# Patient Record
Sex: FEMALE | Race: WHITE | URBAN - NONMETROPOLITAN AREA
[De-identification: names, ages, dates, MRNs, and addresses within clinical notes are randomized per-mention and may not be internally consistent; named-entity substitution may affect disease eponyms.]

---

## 2022-09-20 ENCOUNTER — OFFICE VISIT (OUTPATIENT)
Dept: PSYCHIATRY | Age: 22
End: 2022-09-20
Payer: COMMERCIAL

## 2022-09-20 DIAGNOSIS — F90.2 ATTENTION DEFICIT HYPERACTIVITY DISORDER (ADHD), COMBINED TYPE: ICD-10-CM

## 2022-09-20 DIAGNOSIS — F39 UNSPECIFIED MOOD (AFFECTIVE) DISORDER (HCC): Primary | ICD-10-CM

## 2022-09-20 DIAGNOSIS — F41.9 ANXIETY: ICD-10-CM

## 2022-09-20 PROCEDURE — 90792 PSYCH DIAG EVAL W/MED SRVCS: CPT | Performed by: STUDENT IN AN ORGANIZED HEALTH CARE EDUCATION/TRAINING PROGRAM

## 2022-09-20 RX ORDER — CITALOPRAM 40 MG/1
40 TABLET ORAL DAILY
Qty: 30 TABLET | Refills: 2 | Status: SHIPPED | OUTPATIENT
Start: 2022-09-20
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
## 2022-10-18 ENCOUNTER — OFFICE VISIT (OUTPATIENT)
Dept: PSYCHIATRY | Age: 22
End: 2022-10-18
Payer: COMMERCIAL

## 2022-10-18 DIAGNOSIS — F39 UNSPECIFIED MOOD (AFFECTIVE) DISORDER (HCC): ICD-10-CM

## 2022-10-18 DIAGNOSIS — F41.9 ANXIETY: ICD-10-CM

## 2022-10-18 DIAGNOSIS — F90.2 ATTENTION DEFICIT HYPERACTIVITY DISORDER (ADHD), COMBINED TYPE: ICD-10-CM

## 2022-10-18 DIAGNOSIS — G47.00 INSOMNIA, UNSPECIFIED TYPE: Primary | ICD-10-CM

## 2022-10-18 PROCEDURE — 99214 OFFICE O/P EST MOD 30 MIN: CPT | Performed by: STUDENT IN AN ORGANIZED HEALTH CARE EDUCATION/TRAINING PROGRAM

## 2022-10-18 RX ORDER — PRAZOSIN HYDROCHLORIDE 2 MG/1
2 CAPSULE ORAL NIGHTLY
Qty: 30 CAPSULE | Refills: 3 | Status: SHIPPED | OUTPATIENT
Start: 2022-10-18

## 2022-10-18 NOTE — PROGRESS NOTES
[address]  [facility] [place] [address]  Dept: [phone]  Loc: [address] [place] [address]  2000  10/18/2022     F/U appt    DSM:  Dx:    ICD-10-CM    1. Insomnia, unspecified type  G47.00 prazosin (MINIPRESS) 2 MG capsule      2. Unspecified mood (affective) disorder (HCC)  F39       3. Anxiety  F41.9       4. Attention deficit hyperactivity disorder (ADHD), combined type  F90.2            Interim Hx:  Chief Complaint:   Chief Complaint   Patient presents with    Follow-up    Medication Check      HPI: Patient is a 25year old female who presents for follow up care. Depression and anxiety have both stabilized since increasing citalopram to 40 mg daily. She reports compliance and denies side effects. Her biggest concern is increased irritability which she attributes to poor sleep. She reports frequent awakenings due to night terrors. She reports improved energy and appetite. Denies active and passive SI, HI, AH and VH. ROS:   Gen: Denies F/C/N/V  HEENT: Denies Vision/hearing changes/sore throat  CV: Denies CP/Palp  Pulm: Denies SOB/Cough/Wheeze  GI: Denies Abd pain  Skin: Denies Rashes/Lumps/Bruises  Neurologic: Denies changes in memory/speech/mental status.  Denies h/o seizures/DTs   Psychiatric: denies depression and anxiety     MSE:  Cognition - Alert and Oriented x 4  Appearance - dressed casually and appropriately for the weather  Behavior - pleasant and cooperative  Motor - no tardive dyskinesia or other EPS observed; no psychomotor agitation/retardation observed  Gait and Station - WNL, ambulates without Assistance  Speech - normal rate, rhythm, and volume  Eye Contact - maintains  Mood - \"okay right now\"  Affect - congruent; full range  Thought Content/Perceptions:   Psychosis - denies AH/VH/delusions   SI/HI -  denies  Thought Process/Associations -  logical and linear  Memory - grossly intact  Insight - good  Judgment - good       Diagnosis Orders   1. Insomnia, unspecified type  prazosin (MINIPRESS) 2 MG capsule      2. Unspecified mood (affective) disorder ([address]        3. Anxiety        4. Attention deficit hyperactivity disorder (ADHD), combined type             Plan:  -Start prazosin 2 mg for nightmares. Instructed to discontinue if she feels lightheaded or dizzy.  -Continue citalopram to 40 mg daily for mood and anxiety.  -Consider increasing wellbutrin to 300 mg qam in the future to further target depressive and ADHD symptoms. Wellbutrin can be used off label for ADHD given that it works via norepinephrine and dopamine reuptake inhibition. Denies seizure history.  -Pt reports being adherent to medications and encouraged to continue  -Continue all current psychiatric medications as prescribed  -Discussed medications with the patient and changes are as noted above with patient agreement.  -Discussed diagnosis and treatment plan with the patient  -Provided brief supportive therapy through active listening     -Patient Education: Discussed medications including indications, potential side effects, contraindications, and risks/benefits; also discussed alternative medications/treatments. Discussed the potential need for follow up laboratory studies related to medications and patient demonstrated understanding and compliance. Patient participated in medications decision making was given the opportunity to ask questions/express preferences and concerns. Patient demonstrates good understanding of medications and is compliant/in agreement with current plan. -The patient is a female in her childbearing years. She verbalized understanding that there are no medications that are completely safe in pregnancy or while breastfeeding.  She states she does not believe that she is pregnant now and agrees to contact her prescribing psychiatrist immediately if she is pregnant or planning to become pregnant.      -Continue meds as listed above  -RTC in (4) weeks, sooner if needed  -Labs: n/a  -Discussed abstaining from drugs/alcohol  -Psychotherapy was encouraged.  -Patient informed to call crisis line/911 or go to ER if experiencing crisis, SI, HI, or psychosis.       Mitch Gave, DO  10/18/2022

## 2022-11-11 DIAGNOSIS — G47.00 INSOMNIA, UNSPECIFIED TYPE: ICD-10-CM

## 2022-11-11 RX ORDER — PRAZOSIN HYDROCHLORIDE 2 MG/1
2 CAPSULE ORAL NIGHTLY
Qty: 30 CAPSULE | Refills: 3 | OUTPATIENT
Start: 2022-11-11

## 2022-12-06 ENCOUNTER — TELEMEDICINE (OUTPATIENT)
Dept: PSYCHIATRY | Age: 22
End: 2022-12-06
Payer: COMMERCIAL

## 2022-12-06 DIAGNOSIS — G47.00 INSOMNIA, UNSPECIFIED TYPE: Primary | ICD-10-CM

## 2022-12-06 DIAGNOSIS — F90.2 ATTENTION DEFICIT HYPERACTIVITY DISORDER (ADHD), COMBINED TYPE: ICD-10-CM

## 2022-12-06 DIAGNOSIS — F41.9 ANXIETY: ICD-10-CM

## 2022-12-06 DIAGNOSIS — F39 UNSPECIFIED MOOD (AFFECTIVE) DISORDER (HCC): ICD-10-CM

## 2022-12-06 PROCEDURE — 99214 OFFICE O/P EST MOD 30 MIN: CPT | Performed by: STUDENT IN AN ORGANIZED HEALTH CARE EDUCATION/TRAINING PROGRAM

## 2022-12-06 NOTE — PROGRESS NOTES
[address]  [facility] [address]  Dept: [phone]  Loc: [address] [facility]  2000  12/6/2022     F/U appt    TELEHEALTH EVALUATION -- Audio/Visual (During MXZMT-14 public health emergency)    Patient location: home  Physician location: [place], [facility]  This virtual visit was conducted via interactive, real-time video. DSM:  Dx:    ICD-10-CM    1. Insomnia, unspecified type  G47.00       2. Unspecified mood (affective) disorder (HCC)  F39       3. Anxiety  F41.9       4. Attention deficit hyperactivity disorder (ADHD), combined type  F90.2            Interim Hx:  Chief Complaint:   Chief Complaint   Patient presents with    Depression    Anxiety    Follow-up      HPI: Patient is a 25year old female who presents for follow up care. Depression and anxiety are mildly elevated which patient reports happens every year at this time. Denies acute stressors. She denies side effects. She reports compliance with her medications aside from prazosin which she states she will begin to make nightly. She reports frequent awakenings due to night terrors. Appetite is below baseline. Denies active and passive SI, HI, AH and VH. Patient does not wish to make medication changes. Denies pregnancy. ROS:   Gen: Denies F/C/N/V  HEENT: Denies Vision/hearing changes/sore throat  CV: Denies CP/Palp  Pulm: Denies SOB/Cough/Wheeze  GI: Denies Abd pain  Skin: Denies Rashes/Lumps/Bruises  Neurologic: Denies changes in memory/speech/mental status.  Denies h/o seizures/DTs   Psychiatric: reports depression and anxiety     MSE:  Cognition - Alert and Oriented x 4  Appearance - dressed casually and appropriately for the weather  Behavior - pleasant and cooperative  Motor - no tardive dyskinesia or other EPS observed; no psychomotor agitation/retardation observed  Gait and Station - WNL, ambulates without Assistance  Speech - normal rate, rhythm, and volume  Eye Contact - maintains  Mood - \"okay\"  Affect - congruent; full range  Thought Content/Perceptions:   Psychosis - denies AH/VH/delusions   SI/HI -  denies  Thought Process/Associations -  logical and linear  Memory - grossly intact  Insight - good  Judgment - good       Diagnosis Orders   1. Insomnia, unspecified type        2. Unspecified mood (affective) disorder ([facility] [address]        3. Anxiety        4. Attention deficit hyperactivity disorder (ADHD), combined type             Plan:  -Continue prazosin 2 mg for nightmares. Instructed to discontinue if she feels lightheaded or dizzy.  -Continue citalopram to 40 mg daily for mood and anxiety.  -Consider increasing wellbutrin to 300 mg qam in the future to further target depressive and ADHD symptoms. Wellbutrin can be used off label for ADHD given that it works via norepinephrine and dopamine reuptake inhibition. Denies seizure history.  -Pt reports being adherent to medications and encouraged to continue  -Continue all current psychiatric medications as prescribed  -Discussed medications with the patient and changes are as noted above with patient agreement.  -Discussed diagnosis and treatment plan with the patient  -Provided brief supportive therapy through active listening     -Patient Education: Discussed medications including indications, potential side effects, contraindications, and risks/benefits; also discussed alternative medications/treatments. Discussed the potential need for follow up laboratory studies related to medications and patient demonstrated understanding and compliance. Patient participated in medications decision making was given the opportunity to ask questions/express preferences and concerns. Patient demonstrates good understanding of medications and is compliant/in agreement with current plan. -The patient is a female in her childbearing years.  She verbalized understanding that there are no medications that are completely safe in pregnancy or while breastfeeding. She states she does not believe that she is pregnant now and agrees to contact her prescribing psychiatrist immediately if she is pregnant or planning to become pregnant.      -Continue meds as listed above  -RTC in (4) weeks, sooner if needed  -Labs: n/a  -Discussed abstaining from drugs/alcohol  -Psychotherapy was encouraged.  -Patient informed to call crisis line/911 or go to ER if experiencing crisis, SI, HI, or psychosis. Elmer Jean, was evaluated through a synchronous (real-time) audio-video encounter. The patient (or guardian if applicable) is aware that this is a billable service, which includes applicable copays. This virtual visit was conducted with patient's (and/or legal guardian's) consent. The visit was conducted pursuant to the emergency declaration under the [address] [place] [address] waiver authority and the [employer] General Act. Patient identification was verified, and a caregiver was present when appropriate. The patient was located in a state where the provider was licensed to provide care.       Total time spent for this encounter: 30 minutes    Electronically signed by Edgar Ruiz DO on 12/6/2022 at 10:55 AM       Edgar Ruiz DO  12/6/2022

## 2023-01-09 ENCOUNTER — TELEMEDICINE (OUTPATIENT)
Dept: PSYCHIATRY | Age: 23
End: 2023-01-09
Payer: COMMERCIAL

## 2023-01-09 DIAGNOSIS — F90.2 ATTENTION DEFICIT HYPERACTIVITY DISORDER (ADHD), COMBINED TYPE: ICD-10-CM

## 2023-01-09 DIAGNOSIS — F41.9 ANXIETY: ICD-10-CM

## 2023-01-09 DIAGNOSIS — F39 UNSPECIFIED MOOD (AFFECTIVE) DISORDER (HCC): ICD-10-CM

## 2023-01-09 DIAGNOSIS — G47.00 INSOMNIA, UNSPECIFIED TYPE: Primary | ICD-10-CM

## 2023-01-09 PROCEDURE — 99214 OFFICE O/P EST MOD 30 MIN: CPT | Performed by: STUDENT IN AN ORGANIZED HEALTH CARE EDUCATION/TRAINING PROGRAM

## 2023-01-09 RX ORDER — PRAZOSIN HYDROCHLORIDE 1 MG/1
3 CAPSULE ORAL NIGHTLY
Qty: 90 CAPSULE | Refills: 2 | Status: SHIPPED | OUTPATIENT
Start: 2023-01-09

## 2023-01-09 NOTE — PROGRESS NOTES
[address]  [facility] [address]  Dept: [phone]  Loc: [address] [facility]  2000  1/16/2023     F/U appt    TELEHEALTH EVALUATION -- Audio/Visual (During XDNPC-04 public health emergency)    Patient location: home  Physician location: home, [facility]  This virtual visit was conducted via interactive, real-time video. DSM:  Dx:    ICD-10-CM    1. Insomnia, unspecified type  G47.00 prazosin (MINIPRESS) 1 MG capsule      2. Unspecified mood (affective) disorder (HCC)  F39       3. Anxiety  F41.9       4. Attention deficit hyperactivity disorder (ADHD), combined type  F90.2            Interim Hx:  Chief Complaint:   Chief Complaint   Patient presents with    Depression    Follow-up        HPI: Patient is a 25year old female who presents for follow up care. Depression is mildly elevated which patient reports happens every year at this time. Denies acute stressors. She denies side effects. She reports compliance with her medications. She reports less frequent awakenings due to night terrors, but reports they still occur intermittently. Denies active and passive SI, HI, AH and VH. Patient does not wish to make medication changes as she believes her low mood to be seasonal in nature. Denies pregnancy. Anxiety is stable. ROS:   Gen: Denies F/C/N/V  HEENT: Denies Vision/hearing changes/sore throat  CV: Denies CP/Palp  Pulm: Denies SOB/Cough/Wheeze  GI: Denies Abd pain  Skin: Denies Rashes/Lumps/Bruises  Neurologic: Denies changes in memory/speech/mental status.  Denies h/o seizures/DTs   Psychiatric: reports depression     MSE:  Cognition - Alert and Oriented x 4  Appearance - dressed casually and appropriately for the weather  Behavior - pleasant and cooperative  Motor - no tardive dyskinesia or other EPS observed; no psychomotor agitation/retardation observed  Gait and Station - WNL, ambulates without Assistance  Speech - normal rate, rhythm, and volume  Eye Contact - maintains  Mood - \"okay\"  Affect - congruent; full range  Thought Content/Perceptions:   Psychosis - denies AH/VH/delusions   SI/HI -  denies  Thought Process/Associations -  logical and linear  Memory - grossly intact  Insight - good  Judgment - good       Diagnosis Orders   1. Insomnia, unspecified type  prazosin (MINIPRESS) 1 MG capsule      2. Unspecified mood (affective) disorder ([facility] [address]        3. Anxiety        4. Attention deficit hyperactivity disorder (ADHD), combined type              Plan:  -Continue prazosin 3 mg qhs for nightmares. Instructed to discontinue if she feels lightheaded or dizzy.  -Continue citalopram to 40 mg daily for mood and anxiety.  -Consider increasing wellbutrin to 300 mg qam in the future to further target depressive and ADHD symptoms. Wellbutrin can be used off label for ADHD given that it works via norepinephrine and dopamine reuptake inhibition. Denies seizure history.  -Pt reports being adherent to medications and encouraged to continue  -Continue all current psychiatric medications as prescribed  -Discussed medications with the patient and changes are as noted above with patient agreement.  -Discussed diagnosis and treatment plan with the patient  -Provided brief supportive therapy through active listening     -Patient Education: Discussed medications including indications, potential side effects, contraindications, and risks/benefits; also discussed alternative medications/treatments. Discussed the potential need for follow up laboratory studies related to medications and patient demonstrated understanding and compliance. Patient participated in medications decision making was given the opportunity to ask questions/express preferences and concerns. Patient demonstrates good understanding of medications and is compliant/in agreement with current plan.      -The patient is a female in her childbearing years. She verbalized understanding that there are no medications that are completely safe in pregnancy or while breastfeeding. She states she does not believe that she is pregnant now and agrees to contact her prescribing psychiatrist immediately if she is pregnant or planning to become pregnant.      -Continue meds as listed above  -RTC in (4) weeks, sooner if needed  -Labs: n/a  -Discussed abstaining from drugs/alcohol  -Psychotherapy was encouraged.  -Patient informed to call crisis line/911 or go to ER if experiencing crisis, SI, HI, or psychosis. Keisha Manzo, was evaluated through a synchronous (real-time) audio-video encounter. The patient (or guardian if applicable) is aware that this is a billable service, which includes applicable copays. This virtual visit was conducted with patient's (and/or legal guardian's) consent. The visit was conducted pursuant to the emergency declaration under the [address] [place] [address], [address] [place] [address] authority and the [employer] General Act. Patient identification was verified, and a caregiver was present when appropriate. The patient was located in a state where the provider was licensed to provide care.       Total time spent for this encounter: 30 minutes    Electronically signed by Trev Elkins DO on 1/16/2023 at 12:53 PM       Trev Elkins DO  1/16/2023

## 2023-01-10 DIAGNOSIS — F39 UNSPECIFIED MOOD (AFFECTIVE) DISORDER (HCC): ICD-10-CM

## 2023-01-10 DIAGNOSIS — F41.9 ANXIETY: ICD-10-CM

## 2023-01-10 NOTE — TELEPHONE ENCOUNTER
Maddie called into the office stating that she needs a refill on her Celexa 40mg;#30 with 2 refills;last with a start date of 09/20/22. Medication is pending your approval for a 30 day supply with 0 refills; she is scheduled to return on 02/06/23.

## 2023-01-12 RX ORDER — CITALOPRAM 40 MG/1
40 TABLET ORAL DAILY
Qty: 30 TABLET | Refills: 0 | Status: SHIPPED | OUTPATIENT
Start: 2023-01-12

## 2023-01-31 DIAGNOSIS — G47.00 INSOMNIA, UNSPECIFIED TYPE: ICD-10-CM

## 2023-01-31 RX ORDER — PRAZOSIN HYDROCHLORIDE 1 MG/1
3 CAPSULE ORAL NIGHTLY
Qty: 90 CAPSULE | Refills: 2 | OUTPATIENT
Start: 2023-01-31

## 2024-03-05 ENCOUNTER — NURSE ONLY (OUTPATIENT)
Age: 24
End: 2024-03-05

## 2024-03-05 LAB
ALBUMIN SERPL BCG-MCNC: 4.3 G/DL (ref 3.5–5.1)
ALP SERPL-CCNC: 75 U/L (ref 38–126)
ALT SERPL W/O P-5'-P-CCNC: 13 U/L (ref 11–66)
ANION GAP SERPL CALC-SCNC: 9 MEQ/L (ref 8–16)
AST SERPL-CCNC: 19 U/L (ref 5–40)
BILIRUB SERPL-MCNC: 0.4 MG/DL (ref 0.3–1.2)
BUN SERPL-MCNC: 10 MG/DL (ref 7–22)
CALCIUM SERPL-MCNC: 9.8 MG/DL (ref 8.5–10.5)
CHLORIDE SERPL-SCNC: 103 MEQ/L (ref 98–111)
CO2 SERPL-SCNC: 26 MEQ/L (ref 23–33)
CREAT SERPL-MCNC: 0.6 MG/DL (ref 0.4–1.2)
POTASSIUM SERPL-SCNC: 4 MEQ/L (ref 3.5–5.2)
PROT SERPL-MCNC: 7.6 G/DL (ref 6.1–8)
SODIUM SERPL-SCNC: 138 MEQ/L (ref 135–145)

## 2024-03-11 ENCOUNTER — NURSE ONLY (OUTPATIENT)
Age: 24
End: 2024-03-11

## 2024-03-11 LAB
ALBUMIN SERPL BCG-MCNC: 4.4 G/DL (ref 3.5–5.1)
ALP SERPL-CCNC: 78 U/L (ref 38–126)
ALT SERPL W/O P-5'-P-CCNC: 18 U/L (ref 11–66)
AMYLASE SERPL-CCNC: 51 U/L (ref 20–104)
ANION GAP SERPL CALC-SCNC: 15 MEQ/L (ref 8–16)
AST SERPL-CCNC: 24 U/L (ref 5–40)
BASOPHILS ABSOLUTE: 0.1 THOU/MM3 (ref 0–0.1)
BASOPHILS NFR BLD AUTO: 0.8 %
BILIRUB SERPL-MCNC: 0.5 MG/DL (ref 0.3–1.2)
BUN SERPL-MCNC: 10 MG/DL (ref 7–22)
CALCIUM SERPL-MCNC: 9.7 MG/DL (ref 8.5–10.5)
CHLORIDE SERPL-SCNC: 102 MEQ/L (ref 98–111)
CO2 SERPL-SCNC: 23 MEQ/L (ref 23–33)
CREAT SERPL-MCNC: 0.6 MG/DL (ref 0.4–1.2)
DEPRECATED RDW RBC AUTO: 40.7 FL (ref 35–45)
EOSINOPHIL NFR BLD AUTO: 0.4 %
EOSINOPHILS ABSOLUTE: 0 THOU/MM3 (ref 0–0.4)
ERYTHROCYTE [DISTWIDTH] IN BLOOD BY AUTOMATED COUNT: 12.1 % (ref 11.5–14.5)
GFR SERPL CREATININE-BSD FRML MDRD: > 60 ML/MIN/1.73M2
GLUCOSE SERPL-MCNC: 81 MG/DL (ref 70–108)
HCT VFR BLD AUTO: 37.1 % (ref 37–47)
HGB BLD-MCNC: 12.3 GM/DL (ref 12–16)
IMM GRANULOCYTES # BLD AUTO: 0.02 THOU/MM3 (ref 0–0.07)
IMM GRANULOCYTES NFR BLD AUTO: 0.2 %
LIPASE SERPL-CCNC: 30.8 U/L (ref 5.6–51.3)
LYMPHOCYTES ABSOLUTE: 2.9 THOU/MM3 (ref 1–4.8)
LYMPHOCYTES NFR BLD AUTO: 30 %
MCH RBC QN AUTO: 30.4 PG (ref 26–33)
MCHC RBC AUTO-ENTMCNC: 33.2 GM/DL (ref 32.2–35.5)
MCV RBC AUTO: 91.8 FL (ref 81–99)
MONOCYTES ABSOLUTE: 0.7 THOU/MM3 (ref 0.4–1.3)
MONOCYTES NFR BLD AUTO: 7.2 %
NEUTROPHILS NFR BLD AUTO: 61.4 %
NRBC BLD AUTO-RTO: 0 /100 WBC
PLATELET # BLD AUTO: 379 THOU/MM3 (ref 130–400)
PMV BLD AUTO: 11 FL (ref 9.4–12.4)
POTASSIUM SERPL-SCNC: 4.1 MEQ/L (ref 3.5–5.2)
PROT SERPL-MCNC: 7.7 G/DL (ref 6.1–8)
RBC # BLD AUTO: 4.04 MILL/MM3 (ref 4.2–5.4)
SEGMENTED NEUTROPHILS ABSOLUTE COUNT: 5.8 THOU/MM3 (ref 1.8–7.7)
SODIUM SERPL-SCNC: 140 MEQ/L (ref 135–145)
WBC # BLD AUTO: 9.5 THOU/MM3 (ref 4.8–10.8)